# Patient Record
Sex: MALE
[De-identification: names, ages, dates, MRNs, and addresses within clinical notes are randomized per-mention and may not be internally consistent; named-entity substitution may affect disease eponyms.]

---

## 2017-12-12 ENCOUNTER — APPOINTMENT (OUTPATIENT)
Dept: GASTROENTEROLOGY | Facility: CLINIC | Age: 32
End: 2017-12-12
Payer: COMMERCIAL

## 2017-12-12 VITALS
OXYGEN SATURATION: 94 % | BODY MASS INDEX: 23.49 KG/M2 | HEART RATE: 66 BPM | HEIGHT: 68 IN | SYSTOLIC BLOOD PRESSURE: 115 MMHG | DIASTOLIC BLOOD PRESSURE: 80 MMHG | WEIGHT: 155 LBS | TEMPERATURE: 97.8 F

## 2017-12-12 DIAGNOSIS — R12 HEARTBURN: ICD-10-CM

## 2017-12-12 DIAGNOSIS — K44.9 DIAPHRAGMATIC HERNIA W/OUT OBSTRUCTION OR GANGRENE: ICD-10-CM

## 2017-12-12 PROCEDURE — 99212 OFFICE O/P EST SF 10 MIN: CPT

## 2017-12-12 RX ORDER — RANITIDINE 300 MG/1
300 TABLET ORAL DAILY
Qty: 90 | Refills: 3 | Status: ACTIVE | COMMUNITY
Start: 2017-12-12 | End: 1900-01-01

## 2018-09-06 ENCOUNTER — APPOINTMENT (OUTPATIENT)
Dept: HUMAN REPRODUCTION | Facility: CLINIC | Age: 33
End: 2018-09-06
Payer: COMMERCIAL

## 2018-09-06 PROCEDURE — 89322 SEMEN ANAL STRICT CRITERIA: CPT

## 2019-10-21 ENCOUNTER — APPOINTMENT (OUTPATIENT)
Dept: INTERNAL MEDICINE | Facility: CLINIC | Age: 34
End: 2019-10-21
Payer: COMMERCIAL

## 2019-10-21 VITALS
DIASTOLIC BLOOD PRESSURE: 80 MMHG | OXYGEN SATURATION: 98 % | HEART RATE: 62 BPM | HEIGHT: 67 IN | WEIGHT: 156 LBS | SYSTOLIC BLOOD PRESSURE: 100 MMHG | BODY MASS INDEX: 24.48 KG/M2

## 2019-10-21 DIAGNOSIS — Z00.00 ENCOUNTER FOR GENERAL ADULT MEDICAL EXAMINATION W/OUT ABNORMAL FINDINGS: ICD-10-CM

## 2019-10-21 DIAGNOSIS — F41.9 ANXIETY DISORDER, UNSPECIFIED: ICD-10-CM

## 2019-10-21 PROCEDURE — G0008: CPT

## 2019-10-21 PROCEDURE — 90686 IIV4 VACC NO PRSV 0.5 ML IM: CPT

## 2019-10-21 PROCEDURE — 99215 OFFICE O/P EST HI 40 MIN: CPT | Mod: 25

## 2019-10-28 PROBLEM — F41.9 ANXIETY: Status: ACTIVE | Noted: 2019-10-28

## 2019-10-28 NOTE — HISTORY OF PRESENT ILLNESS
[FreeTextEntry8] : 32 yo [place] male, referred here by his wife, who joined my practice last week. They have two donal children.\anika Referred by his wife to assess his mood. 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 \par He has his own PCP over 10 years, but his wife wants second opinion. He prefers to stay with his PCP, just had CPE last month, and brought in labs that are all normal.\par Admits he has gotten upset at times but feels he has been stressed with running family busniess and wife is stay home mom with  and nanny 4 days of the week, while spending money on herself , and houseis still messy. Want to do couple counseling but wife declined until he sees me.

## 2019-10-28 NOTE — PHYSICAL EXAM
[No Acute Distress] : no acute distress [Well Nourished] : well nourished [Normal Rate] : normal rate  [No Abdominal Bruit] : a ~M bruit was not heard ~T in the abdomen [No Rash] : no rash [Coordination Grossly Intact] : coordination grossly intact [No Focal Deficits] : no focal deficits [Speech Grossly Normal] : speech grossly normal [Memory Grossly Normal] : memory grossly normal [Alert and Oriented x3] : oriented to person, place, and time [Normal Mood] : the mood was normal [Normal Insight/Judgement] : insight and judgment were intact

## 2019-10-28 NOTE — HISTORY OF PRESENT ILLNESS
[FreeTextEntry8] : 34 yo [place] male, referred here by his wife, who joined my practice last week. They have two donal children.\anika Referred by his wife to assess his mood. 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 \par He has his own PCP over 10 years, but his wife wants second opinion. He prefers to stay with his PCP, just had CPE last month, and brought in labs that are all normal.\par Admits he has gotten upset at times but feels he has been stressed with running family busniess and wife is stay home mom with  and nanny 4 days of the week, while spending money on herself , and houseis still messy. Want to do couple counseling but wife declined until he sees me.

## 2020-08-24 ENCOUNTER — NON-APPOINTMENT (OUTPATIENT)
Age: 35
End: 2020-08-24

## 2020-08-24 ENCOUNTER — EMERGENCY (EMERGENCY)
Facility: HOSPITAL | Age: 35
LOS: 0 days | Discharge: HOME | End: 2020-08-24
Payer: COMMERCIAL

## 2020-08-24 VITALS
DIASTOLIC BLOOD PRESSURE: 80 MMHG | OXYGEN SATURATION: 98 % | HEART RATE: 66 BPM | SYSTOLIC BLOOD PRESSURE: 115 MMHG | TEMPERATURE: 97 F | RESPIRATION RATE: 18 BRPM

## 2020-08-24 DIAGNOSIS — J02.9 ACUTE PHARYNGITIS, UNSPECIFIED: ICD-10-CM

## 2020-08-24 DIAGNOSIS — Z11.59 ENCOUNTER FOR SCREENING FOR OTHER VIRAL DISEASES: ICD-10-CM

## 2020-08-24 PROCEDURE — 99283 EMERGENCY DEPT VISIT LOW MDM: CPT

## 2020-08-24 PROCEDURE — U0003: CPT

## 2020-08-24 NOTE — ED STATDOCS - PHYSICAL EXAMINATION
Constitutional: NAD AAOx3. Nontoxic, well appearing. Speaking full sentences  w/o distress  Eyes: EOMI, pupils equal  Head: Normocephalic atraumatic  Mouth: no airway obstruction  Cardiac: [id number]   Resp: Lungs CTAB  GI: Abd s/nt/nd  Neuro: motor and sensory intact

## 2020-08-24 NOTE — ED STATDOCS - OBJECTIVE STATEMENT
Pt presents to ED with sore throat x 3 days pt denies any chest pain, sob, dyspnea or any other complaints. pt unsure if exposed to COVID.   Pt concerned for possible COVID-19.   Pt here for testing.

## 2020-08-24 NOTE — ED STATDOCS - PATIENT PORTAL LINK FT
You can access the FollowMyHealth Patient Portal offered by [facility] by registering at the following website: [email or web address]. By joining [facility]’s FollowMyHealth portal, you will also be able to view your health information using other applications (apps) compatible with our system.

## 2020-08-24 NOTE — ED STATDOCS - CLINICAL SUMMARY MEDICAL DECISION MAKING FREE TEXT BOX
patient presents with sore throat, concern for COVID exposure.  As patient is nontoxic appearing will test for COVID and d/c.  Quarantine reviewed and return precautions reviewed.

## 2020-08-25 LAB — SARS-COV-2 RNA SPEC QL NAA+PROBE: SIGNIFICANT CHANGE UP

## 2021-01-31 ENCOUNTER — OUTPATIENT (OUTPATIENT)
Dept: OUTPATIENT SERVICES | Facility: HOSPITAL | Age: 36
LOS: 1 days | End: 2021-01-31
Payer: COMMERCIAL

## 2021-01-31 DIAGNOSIS — Z11.52 ENCOUNTER FOR SCREENING FOR COVID-19: ICD-10-CM

## 2021-01-31 PROBLEM — Z78.9 OTHER SPECIFIED HEALTH STATUS: Chronic | Status: ACTIVE | Noted: 2020-08-24

## 2021-01-31 LAB — SARS-COV-2 RNA SPEC QL NAA+PROBE: SIGNIFICANT CHANGE UP

## 2021-01-31 PROCEDURE — U0003: CPT

## 2021-01-31 PROCEDURE — C9803: CPT

## 2021-01-31 PROCEDURE — U0005: CPT

## 2023-03-30 ENCOUNTER — APPOINTMENT (OUTPATIENT)
Dept: ORTHOPEDIC SURGERY | Facility: CLINIC | Age: 38
End: 2023-03-30
Payer: COMMERCIAL

## 2023-03-30 DIAGNOSIS — S93.402A SPRAIN OF UNSPECIFIED LIGAMENT OF LEFT ANKLE, INITIAL ENCOUNTER: ICD-10-CM

## 2023-03-30 PROCEDURE — 73610 X-RAY EXAM OF ANKLE: CPT | Mod: LT

## 2023-03-30 PROCEDURE — 99204 OFFICE O/P NEW MOD 45 MIN: CPT

## 2023-03-30 NOTE — PHYSICAL EXAM
[2+] : posterior tibialis pulse: 2+ [Normal] : saphenous nerve sensation normal [Mild] : mild diffused ankle swelling [4___] : eversion 4[unfilled]/5 [] : non-antalgic [Left] : left ankle [Weight -] : weightbearing [FreeTextEntry9] : No fractures seen.  Questionable loose body in medial gutter. [de-identified] : plantar flexion 20 degrees [de-identified] : inversion 10 degrees [de-identified] : eversion 10 degrees [TWNoteComboBox7] : dorsiflexion 5 degrees

## 2023-03-30 NOTE — HISTORY OF PRESENT ILLNESS
[Sudden] : sudden [5] : 5 [4] : 4 [de-identified] : Pt is a 37 year old male who presents today for evaluation of his left ankle. Pt states that he twisted his ankle 3/6/23 with severe swelling and pain. Went to  where XR showed no evidence of fx and placed in an AirSport. Pain localized along the lateral ankle and Achilles.  He was getting better but all of a sudden got worse over the past several days. Denies previous injury. + numbness/tingling. Ice to affected area, Diclofenac/Meloxicam PRN. No formal ortho treatment to date. WB in sneakers with brace.  [] : Post Surgical Visit: no [FreeTextEntry1] : L ankle

## 2023-03-30 NOTE — ASSESSMENT
[FreeTextEntry1] : CT ordered to rule out loose body in ankle joint.\par PT to be started.pending CT results.\par Patient to wean out of brace into a compression sleeve.

## 2023-03-31 ENCOUNTER — RESULT REVIEW (OUTPATIENT)
Age: 38
End: 2023-03-31

## 2023-04-06 ENCOUNTER — APPOINTMENT (OUTPATIENT)
Dept: ORTHOPEDIC SURGERY | Facility: CLINIC | Age: 38
End: 2023-04-06
Payer: COMMERCIAL

## 2023-04-06 DIAGNOSIS — S93.402D SPRAIN OF UNSPECIFIED LIGAMENT OF LEFT ANKLE, SUBSEQUENT ENCOUNTER: ICD-10-CM

## 2023-04-06 DIAGNOSIS — R29.898 OTHER SYMPTOMS AND SIGNS INVOLVING THE MUSCULOSKELETAL SYSTEM: ICD-10-CM

## 2023-04-06 PROCEDURE — 99214 OFFICE O/P EST MOD 30 MIN: CPT

## 2023-04-06 NOTE — ASSESSMENT
[FreeTextEntry1] : CT report and films reviewed with patient.\par \par Can wean from brace as symptoms dictate.\par WBAT in supportive footwear.\par \par Recommend a course of PT.\par NSAIDS, ice, avoid high impact activities.

## 2023-04-06 NOTE — HISTORY OF PRESENT ILLNESS
[Sudden] : sudden [5] : 5 [4] : 4 [de-identified] : Pt is a 37 year old male who returns today for f/u of his left ankle. Pt states that he twisted his ankle 3/6/23 with severe swelling and pain. Went to Urgent Care and provided ankle brace for support. He was getting better but all of a sudden got worse over the past several days. Denies previous injury. Ice to affected area, NSAIDS PRN. WB in sneakers with brace. Presents today for CT results to rule out loose body in ankle joint.  [] : Post Surgical Visit: no [FreeTextEntry1] : L ankle

## 2023-04-06 NOTE — DATA REVIEWED
[CT Scan] : CT scan [Left] : left [Ankle] : ankle [Report was reviewed and noted in the chart] : The report was reviewed and noted in the chart [I independently reviewed and interpreted images and report] : I independently reviewed and interpreted images and report [FreeTextEntry1] : ZP: Motion artifact significantly degrading the imaging study.\par \par Deformity of the dorsal aspect of the navicular, consistent with an old fracture\par that has healed. No evidence for current fractures.\par \par Sclerosis of the medial aspect of the talar dome, without clear evidence for an\par unstable osteochondral lesion.\par

## 2023-04-06 NOTE — PHYSICAL EXAM
[4___] : eversion 4[unfilled]/5 [2+] : posterior tibialis pulse: 2+ [Normal] : saphenous nerve sensation normal [Left] : left ankle [Weight -] : weightbearing [FreeTextEntry9] : No fractures seen.  Questionable loose body in medial gutter. [NL (40)] : plantar flexion 40 degrees [] : non-antalgic [de-identified] : Difficulty with circumduction.  [de-identified] : Wearing lace-up brace.  [de-identified] : plantar flexion 0 degrees [de-identified] : inversion 20 degrees [de-identified] : eversion 15 degrees [TWNoteComboBox7] : dorsiflexion 15 degrees

## 2023-05-04 ENCOUNTER — APPOINTMENT (OUTPATIENT)
Dept: ORTHOPEDIC SURGERY | Facility: CLINIC | Age: 38
End: 2023-05-04
Payer: COMMERCIAL

## 2023-05-04 VITALS — BODY MASS INDEX: 24.48 KG/M2 | HEIGHT: 67 IN | WEIGHT: 156 LBS

## 2023-05-04 DIAGNOSIS — M25.672 STIFFNESS OF LEFT ANKLE, NOT ELSEWHERE CLASSIFIED: ICD-10-CM

## 2023-05-04 PROCEDURE — 99213 OFFICE O/P EST LOW 20 MIN: CPT

## 2023-05-04 NOTE — PHYSICAL EXAM
[Left] : left foot and ankle [NL (40)] : plantar flexion 40 degrees [2+] : posterior tibialis pulse: 2+ [Normal] : saphenous nerve sensation normal [NL (20)] : eversion 20 degrees [5___] : eversion 5[unfilled]/5 [] : patient ambulates without assistive device [FreeTextEntry9] : ROM is symmetrical.  [de-identified] : inversion 20 degrees [de-identified] : eversion 15 degrees [TWNoteComboBox7] : dorsiflexion 15 degrees

## 2023-05-04 NOTE — HISTORY OF PRESENT ILLNESS
[Sudden] : sudden [4] : 4 [Intermittent] : intermittent [Household chores] : household chores [Physical therapy] : physical therapy [Walking] : walking [de-identified] : Pt is a 37 year old male who returns today for f/u of his left ankle. Pt. states that he twisted his ankle 3/6/23 with severe swelling and pain. Went to Urgent Care and provided ankle brace for support. He was getting better but all of a sudden got worse, CT showed no sign of loose body. He has been WB in supportive footwear, doing PT and has noticed improvement compared to last visit. \par \par  [] : Post Surgical Visit: no [FreeTextEntry1] : L ankle [de-identified] : physical therapy

## 2023-07-12 ENCOUNTER — APPOINTMENT (OUTPATIENT)
Dept: ORTHOPEDIC SURGERY | Facility: CLINIC | Age: 38
End: 2023-07-12
Payer: COMMERCIAL

## 2023-07-12 VITALS — WEIGHT: 156 LBS | HEIGHT: 67 IN | BODY MASS INDEX: 24.48 KG/M2

## 2023-07-12 DIAGNOSIS — M25.671 STIFFNESS OF RIGHT ANKLE, NOT ELSEWHERE CLASSIFIED: ICD-10-CM

## 2023-07-12 PROCEDURE — 99214 OFFICE O/P EST MOD 30 MIN: CPT

## 2023-07-12 PROCEDURE — 73610 X-RAY EXAM OF ANKLE: CPT | Mod: RT

## 2023-07-12 RX ORDER — MELOXICAM 15 MG/1
15 TABLET ORAL DAILY
Qty: 30 | Refills: 3 | Status: COMPLETED | COMMUNITY
Start: 2023-07-12 | End: 2023-11-09

## 2023-07-12 NOTE — PHYSICAL EXAM
[5___] : eversion 5[unfilled]/5 [2+] : posterior tibialis pulse: 2+ [Normal] : saphenous nerve sensation normal [4___] : plantar flexion 4[unfilled]/5 [] : non-antalgic [Right] : right ankle [Weight -] : weightbearing [There are no fractures, subluxations or dislocations. No significant abnormalities are seen] : There are no fractures, subluxations or dislocations. No significant abnormalities are seen [FreeTextEntry9] : Achilles insertional spur. [de-identified] : plantar flexion 30 degrees [de-identified] : inversion 10 degrees [de-identified] : eversion 10 degrees [TWNoteComboBox7] : dorsiflexion 10 degrees

## 2023-07-12 NOTE — ASSESSMENT
[FreeTextEntry1] : Patient will start PT and was instructed on how to do home stretching exercises.\par Heel lifts provided.\par Icing prn\par Meloxicam prescribed.\par \par The patient was explained the options as well as benefits of over the counter verses prescription strength nonsteroidal anti-inflammatory medication. The patient opts for a prescription strength medication.\par

## 2023-07-12 NOTE — HISTORY OF PRESENT ILLNESS
[6] : 6 [2] : 2 [Throbbing] : throbbing [Squeezing] : squeezing [de-identified] : Pt is a 37 year old male who presents today for evaluation of his right ankle. Denies trauma. Developed posterior ankle pain late June 2023. No numbness/tingling. Ice and elevation to affected area. NSAIDS prn. WB in dress shoes and compression sock. No previous injury/problem with right ankle. He had a prior left ankle issue which has completely resolved. [FreeTextEntry1] : Right Ankle

## 2023-08-17 ENCOUNTER — APPOINTMENT (OUTPATIENT)
Dept: ORTHOPEDIC SURGERY | Facility: CLINIC | Age: 38
End: 2023-08-17

## 2024-03-21 ENCOUNTER — APPOINTMENT (OUTPATIENT)
Dept: ORTHOPEDIC SURGERY | Facility: CLINIC | Age: 39
End: 2024-03-21
Payer: COMMERCIAL

## 2024-03-21 DIAGNOSIS — M76.61 ACHILLES TENDINITIS, RIGHT LEG: ICD-10-CM

## 2024-03-21 PROCEDURE — 99214 OFFICE O/P EST MOD 30 MIN: CPT

## 2024-03-21 NOTE — DISCUSSION/SUMMARY
[de-identified] : MRI report and films reviewed with patient.  WBAT in supportive footwear, heel lifts. Ice to affected area. Stretching exercises recommended and demonstrated to patient. NSAIDS prn. Activity modification.

## 2024-03-21 NOTE — DATA REVIEWED
[MRI] : MRI [Right] : of the right [Ankle] : ankle [I reviewed the films/CD] : I reviewed the films/CD [FreeTextEntry1] : BRYAN 1/8/24:  Moderate posterior calcaneal stress reaction with an 8 mm insertional Achilles enthesophyte and fracture at the base of the enthesophyte of indeterminate age. Intact Achilles tendon.

## 2024-03-21 NOTE — PHYSICAL EXAM
[2+] : posterior tibialis pulse: 2+ [Normal] : sural nerve sensation normal [Right] : right ankle [Weight -] : weightbearing [There are no fractures, subluxations or dislocations. No significant abnormalities are seen] : There are no fractures, subluxations or dislocations. No significant abnormalities are seen [FreeTextEntry9] : Achilles insertional spur. [NL (40)] : plantar flexion 40 degrees [NL (20)] : eversion 20 degrees [5___] : plantar flexion 5[unfilled]/5 [] : non-antalgic [de-identified] : inversion 20 degrees

## 2024-03-21 NOTE — HISTORY OF PRESENT ILLNESS
[6] : 6 [2] : 2 [Throbbing] : throbbing [Squeezing] : squeezing [de-identified] : Pt. is a 38 year old male who presents today for evaluation of his right ankle. History of achilles tendinitis. Denies trauma. Developed posterior ankle pain late June 2023. He has been having intermittent symptoms since onset. He has been doing PT and stretching exercises. WB in sneakers. No injury or trauma reported. There is no pain today.  [FreeTextEntry1] : Right Ankle